# Patient Record
Sex: MALE | Race: WHITE | Employment: FULL TIME | URBAN - METROPOLITAN AREA
[De-identification: names, ages, dates, MRNs, and addresses within clinical notes are randomized per-mention and may not be internally consistent; named-entity substitution may affect disease eponyms.]

---

## 2017-10-30 ENCOUNTER — OFFICE VISIT (OUTPATIENT)
Dept: FAMILY MEDICINE CLINIC | Age: 52
End: 2017-10-30
Payer: COMMERCIAL

## 2017-10-30 VITALS
WEIGHT: 223.11 LBS | TEMPERATURE: 97.2 F | HEART RATE: 67 BPM | BODY MASS INDEX: 31.23 KG/M2 | DIASTOLIC BLOOD PRESSURE: 78 MMHG | RESPIRATION RATE: 18 BRPM | SYSTOLIC BLOOD PRESSURE: 132 MMHG | HEIGHT: 71 IN

## 2017-10-30 DIAGNOSIS — L73.9 FOLLICULITIS: Primary | ICD-10-CM

## 2017-10-30 PROCEDURE — 99213 OFFICE O/P EST LOW 20 MIN: CPT | Performed by: INTERNAL MEDICINE

## 2017-10-30 RX ORDER — MUPIROCIN CALCIUM 20 MG/G
CREAM TOPICAL
Qty: 30 G | Refills: 0 | Status: SHIPPED | OUTPATIENT
Start: 2017-10-30 | End: 2017-11-29

## 2017-10-30 RX ORDER — PREDNISONE 10 MG/1
10 TABLET ORAL DAILY
Qty: 5 TABLET | Refills: 0 | Status: SHIPPED | OUTPATIENT
Start: 2017-10-30 | End: 2017-11-04

## 2017-10-30 RX ORDER — LORAZEPAM 0.5 MG/1
0.5 TABLET ORAL
COMMUNITY
Start: 2017-10-18 | End: 2018-05-07 | Stop reason: ALTCHOICE

## 2017-10-30 ASSESSMENT — ENCOUNTER SYMPTOMS
FACIAL SWELLING: 1
EYE REDNESS: 0
SINUS PRESSURE: 0
SORE THROAT: 0
EYE DISCHARGE: 0
TROUBLE SWALLOWING: 0
VOICE CHANGE: 0
PHOTOPHOBIA: 0
EYE PAIN: 0
SINUS PAIN: 0
EYE ITCHING: 0
RHINORRHEA: 0

## 2017-10-30 ASSESSMENT — PATIENT HEALTH QUESTIONNAIRE - PHQ9
1. LITTLE INTEREST OR PLEASURE IN DOING THINGS: 0
2. FEELING DOWN, DEPRESSED OR HOPELESS: 0
SUM OF ALL RESPONSES TO PHQ QUESTIONS 1-9: 0
SUM OF ALL RESPONSES TO PHQ9 QUESTIONS 1 & 2: 0

## 2017-10-30 NOTE — PROGRESS NOTES
Visit Information    Have you changed or started any medications since your last visit including any over-the-counter medicines, vitamins, or herbal medicines? no   Are you having any side effects from any of your medications? -  no  Have you stopped taking any of your medications? Is so, why? -  no    Have you seen any other physician or provider since your last visit? No  Have you had any other diagnostic tests since your last visit? No  Have you been seen in the emergency room and/or had an admission to a hospital since we last saw you? No  Have you had your routine dental cleaning in the past 6 months? no    Have you activated your [employer] account? If not, what are your barriers?  No: Discussed at [facility]     Patient Care Team:  Bell Garrett PA-C as PCP - General (Family Medicine)    Medical History Review  Past Medical, Family, and Social History reviewed and does not contribute to the patient presenting condition    Health Maintenance   Topic Date Due    DTaP/Tdap/Td vaccine (1 - Tdap) 01/06/1983    Lipid screen  01/06/2004    Flu vaccine (1) 09/01/2017    Colon cancer screen colonoscopy  12/29/2025    Hepatitis C screen  Addressed    HIV screen  Addressed

## 2017-10-30 NOTE — PROGRESS NOTES
[address] [facility] [address]  Dept: [phone]  Dept Fax: [phone]    Ky Ball is a 47 y.o. male who presents today for his medical conditions/complaints as noted below. Ky Ball is c/o of   Chief Complaint   Patient presents with    Hair/Scalp Problem     Ingrown  hair right nostril         HPI:     Patient had \"ingrown hair /bump\" in his right outer nostril first noticed about a week ago and then symptoms got very bad to wear it hurt all the time and he noticed some associated right sided facial swelling as well. No fever or chills. He does use an electric tweezers to trim his nose hairs quite regularly. But has never had this happen before. Wife is nurse  And they have been doing warm compresses to the area and also took several days of steroid he had at home and he states today it feels much better more so the last 1-2 days much improved. No real pain . He states he noticed it has scabbed over when he feels it . Foreign Body in [address]   The incident occurred 3 to 5 days ago. Pertinent negatives include no congestion, drooling, fever, hearing loss, nosebleeds, sore throat or trouble swallowing. No results found for: LABA1C          ( goal A1C is < 7)   No results found for: LABMICR  No results found for: LDLCHOLESTEROL, LDLCALC    (goal LDL is <100)   No results found for: AST, ALT, BUN  BP Readings from Last 3 Encounters:   10/30/17 132/78   05/03/16 116/78   02/24/16 122/76          (goal 120/80)    History reviewed. No pertinent past medical history. History reviewed. No pertinent surgical history.     Family History   Problem Relation Age of Onset    Heart Failure Father      mi 43       Social History   Substance Use Topics    Smoking status: Never Smoker    Smokeless tobacco: Never Used    Alcohol use 0.0 oz/week      Current Outpatient Prescriptions   Medication Sig Dispense Refill  LORazepam (ATIVAN) 0.5 MG tablet Take 0.5 mg by mouth      mupirocin (BACTROBAN) 2 % cream Apply 3 times daily for 5 days 30 g 0    predniSONE (DELTASONE) 10 MG tablet Take 1 tablet by mouth daily for 5 days 5 tablet 0     No current facility-administered medications for this visit. No Known Allergies    Health Maintenance   Topic Date Due    DTaP/Tdap/Td vaccine (1 - Tdap) 01/06/1983    Lipid screen  01/06/2004    Flu vaccine (1) 09/01/2017    Colon cancer screen colonoscopy  12/29/2025    Hepatitis C screen  Addressed    HIV screen  Addressed       Subjective:      Review of Systems   Constitutional: Negative for activity change, appetite change, fatigue, fever and unexpected weight change. HENT: Positive for facial swelling. Negative for congestion, dental problem, drooling, ear discharge, ear pain, hearing loss, mouth sores, nosebleeds, postnasal drip, rhinorrhea, sinus pain, sinus pressure, sneezing, sore throat, tinnitus, trouble swallowing and voice change. Eyes: Negative for photophobia, pain, discharge, redness, itching and visual disturbance. Skin: Positive for wound. Objective:     Physical Exam   Constitutional: He appears well-developed and well-nourished. Non-toxic appearance. He does not have a sickly appearance. He does not appear ill. No distress. HENT:   Nose: Mucosal edema, sinus tenderness and nasal deformity present. No rhinorrhea, nose lacerations, septal deviation or nasal septal hematoma. No epistaxis. No foreign bodies. Right sinus exhibits no maxillary sinus tenderness and no frontal sinus tenderness. Left sinus exhibits no maxillary sinus tenderness and no frontal sinus tenderness. Purulent appearing discharge which has dried /scabbed to the very bottom outer /lower right lateral nostril . Sigficant erythema and mild swelling to the region as well. Cardiovascular: Normal rate, regular rhythm and normal heart sounds.     Pulmonary/Chest: Effort normal and breath sounds normal.   Abdominal: Soft. Bowel sounds are normal. There is no splenomegaly or hepatomegaly. Skin: Skin is warm and dry. No rash noted. He is not diaphoretic. Psychiatric: He has a normal mood and affect. Nursing note and vitals reviewed. /78 (Site: Left Arm, Position: Sitting, Cuff Size: Large Adult)   Pulse 67   Temp 97.2 °F (36.2 °C) (Tympanic)   Resp 18   Ht 5' 10.98\" (1.803 m)   Wt 223 lb 1.7 oz (101.2 kg)   BMI 31.13 kg/m²     Assessment:      1. Folliculitis  mupirocin (BACTROBAN) 2 % cream             Plan:      Return if symptoms worsen or fail to improve. No orders of the defined types were placed in this encounter. Orders Placed This Encounter   Medications    mupirocin (BACTROBAN) 2 % cream     Sig: Apply 3 times daily for 5 days     Dispense:  30 g     Refill:  0    predniSONE (DELTASONE) 10 MG tablet     Sig: Take 1 tablet by mouth daily for 5 days     Dispense:  5 tablet     Refill:  0          Since steroids helped can give a very low dose you can use for the next few days. Do not use any NSAIDs while on this medication . Also given bactroban cream to put on nose. Likely opened spontaneously and pain control through steroids he had at home. Advised though if pain still present after a few weeks and scabbing is gone needs to be seen to take another look . Can continue warm compresses. Nothing else in nose. Do not use tweezers . Patient given educational materials - see patient instructions. Discussed use, benefit, and side effects of prescribed medications. All patient questions answered. Pt voiced understanding. Instructed to continue current medications, diet and exercise. Patient agreed with treatment plan. Follow up as directed.      Electronically signed by Yasmine Fox DO on 10/30/2017 at 4:51 PM

## 2018-03-06 ENCOUNTER — OFFICE VISIT (OUTPATIENT)
Dept: FAMILY MEDICINE CLINIC | Age: 53
End: 2018-03-06
Payer: COMMERCIAL

## 2018-03-06 VITALS
SYSTOLIC BLOOD PRESSURE: 132 MMHG | TEMPERATURE: 98.1 F | RESPIRATION RATE: 18 BRPM | HEIGHT: 71 IN | BODY MASS INDEX: 31.23 KG/M2 | WEIGHT: 223.11 LBS | OXYGEN SATURATION: 97 % | DIASTOLIC BLOOD PRESSURE: 76 MMHG | HEART RATE: 97 BPM

## 2018-03-06 DIAGNOSIS — M54.50 ACUTE BILATERAL LOW BACK PAIN WITHOUT SCIATICA: Primary | ICD-10-CM

## 2018-03-06 DIAGNOSIS — M62.838 MUSCLE SPASM: ICD-10-CM

## 2018-03-06 PROCEDURE — 99213 OFFICE O/P EST LOW 20 MIN: CPT | Performed by: INTERNAL MEDICINE

## 2018-03-06 RX ORDER — PREDNISONE 20 MG/1
TABLET ORAL
Qty: 10 TABLET | Refills: 0 | Status: SHIPPED | OUTPATIENT
Start: 2018-03-06 | End: 2018-03-12 | Stop reason: ALTCHOICE

## 2018-03-06 RX ORDER — BACLOFEN 10 MG/1
10 TABLET ORAL 3 TIMES DAILY PRN
Qty: 30 TABLET | Refills: 0 | Status: SHIPPED | OUTPATIENT
Start: 2018-03-06 | End: 2018-03-12 | Stop reason: ALTCHOICE

## 2018-03-06 RX ORDER — TRAMADOL HYDROCHLORIDE 50 MG/1
50 TABLET ORAL EVERY 8 HOURS PRN
Qty: 21 TABLET | Refills: 0 | Status: SHIPPED | OUTPATIENT
Start: 2018-03-06 | End: 2018-03-12 | Stop reason: ALTCHOICE

## 2018-03-06 ASSESSMENT — ENCOUNTER SYMPTOMS
DIARRHEA: 0
BACK PAIN: 1
NAUSEA: 0
STRIDOR: 0
ANAL BLEEDING: 0
ABDOMINAL DISTENTION: 0
ABDOMINAL PAIN: 0
BLOOD IN STOOL: 0
SHORTNESS OF BREATH: 0
CONSTIPATION: 0
COLOR CHANGE: 0
BOWEL INCONTINENCE: 0

## 2018-03-06 NOTE — PROGRESS NOTES
[address] [facility] [address]  Dept: [phone]  Dept Fax: [phone]    John Dhillon is a 46 y.o. male who presents today for his medical conditions/complaints as noted below. John Dhillon is c/o of   Chief Complaint   Patient presents with    Lower Back Pain     After plane ride past 1 week, shooting pain         HPI:     Was on an air plane nine days ago and after that feels is when the pain started   Has a lot of stiffness when he first gets up in the morning   No numbness or tingling   No loss of bladder or bowel   No leg pain or muscle weakness  No sciatic symptoms. Has taken aleve and ibuprofen without any relief . No other gi symptoms. Has never had chronic back issues only midline low back pain       Back Pain   This is a new problem. The current episode started 1 to 4 weeks ago. The problem occurs 2 to 4 times per day. The problem is unchanged. The pain is present in the lumbar spine. The quality of the pain is described as aching, cramping and stabbing. The pain does not radiate. The pain is at a severity of 8/10. The pain is moderate. The pain is worse during the day. The symptoms are aggravated by bending, twisting and position. Stiffness is present in the morning. Pertinent negatives include no abdominal pain, bladder incontinence, bowel incontinence, chest pain, dysuria, fever, headaches, leg pain, numbness, paresis, paresthesias, pelvic pain, tingling or weakness. Risk factors include poor posture. He has tried analgesics, heat, home exercises, NSAIDs and walking for the symptoms. The treatment provided mild relief.        No results found for: LABA1C          ( goal A1C is < 7)   No results found for: LABMICR  No results found for: LDLCHOLESTEROL, LDLCALC    (goal LDL is <100)   No results found for: AST, ALT, BUN  BP Readings from Last 3 Encounters:   03/06/18 132/76   10/30/17 132/78   05/03/16 116/78          (goal 120/80)    History reviewed. No pertinent past medical history. History reviewed. No pertinent surgical history. Family History   Problem Relation Age of Onset    No Known Problems Mother     Heart Failure Father      mi 43       Social History   Substance Use Topics    Smoking status: Never Smoker    Smokeless tobacco: Never Used    Alcohol use 0.0 oz/week      Current Outpatient Prescriptions   Medication Sig Dispense Refill    predniSONE (DELTASONE) 20 MG tablet Take two tablets once daily in the morning for 5 days 10 tablet 0    baclofen (LIORESAL) 10 MG tablet Take 1 tablet by mouth 3 times daily as needed (muscle spasm) 30 tablet 0    traMADol (ULTRAM) 50 MG tablet Take 1 tablet by mouth every 8 hours as needed for Pain for up to 7 days. 21 tablet 0    LORazepam (ATIVAN) 0.5 MG tablet Take 0.5 mg by mouth       No current facility-administered medications for this visit. No Known Allergies    Health Maintenance   Topic Date Due    DTaP/Tdap/Td vaccine (1 - Tdap) 01/06/1983    Lipid screen  01/06/2004    Shingles Vaccine (1 of 2 - 2 Dose Series) 01/06/2014    Flu vaccine (1) 01/01/2019 (Originally 9/1/2017)    Diabetes screen  05/03/2019    Colon cancer screen colonoscopy  12/29/2025    Hepatitis C screen  Addressed    HIV screen  Addressed       Subjective:      Review of Systems   Constitutional: Positive for activity change. Negative for appetite change, chills, diaphoresis, fatigue, fever and unexpected weight change. Eyes: Negative for visual disturbance. Respiratory: Negative for shortness of breath and stridor. Cardiovascular: Negative for chest pain, palpitations and leg swelling. Gastrointestinal: Negative for abdominal distention, abdominal pain, anal bleeding, blood in stool, bowel incontinence, constipation, diarrhea and nausea. Genitourinary: Negative for bladder incontinence, dysuria and pelvic pain.    Musculoskeletal: Positive for arthralgias, back pain and gait problem. Negative for joint swelling, myalgias, neck pain and neck stiffness. Skin: Negative for color change and rash. Neurological: Negative for dizziness, tingling, seizures, weakness, light-headedness, numbness, headaches and paresthesias. Hematological: Negative for adenopathy. Psychiatric/Behavioral: Negative for sleep disturbance. Objective:     Physical Exam   Constitutional: He is oriented to person, place, and time. He appears well-developed and well-nourished. He is active. Non-toxic appearance. He does not have a sickly appearance. He does not appear ill. He appears distressed. Cardiovascular: Normal rate, regular rhythm and normal heart sounds. Exam reveals no gallop. No murmur heard. Pulmonary/Chest: Effort normal and breath sounds normal. No respiratory distress. He has no wheezes. Abdominal: Soft. Bowel sounds are normal. There is no splenomegaly or hepatomegaly. There is no tenderness. Musculoskeletal:        Lumbar back: He exhibits decreased range of motion, tenderness, bony tenderness, pain and spasm. He exhibits no swelling, no edema, no deformity, no laceration and normal pulse. Negative straight leg raise    Neurological: He is alert and oriented to person, place, and time. He has normal strength and normal reflexes. He displays no atrophy and no tremor. A sensory deficit is present. No cranial nerve deficit. He exhibits normal muscle tone. Gait abnormal. Coordination normal.   Skin: Skin is warm and dry. No rash noted. Psychiatric: He has a normal mood and affect. Nursing note and vitals reviewed. /76 (Site: Left Arm, Position: Sitting, Cuff Size: Large Adult)   Pulse 97   Temp 98.1 °F (36.7 °C) (Tympanic)   Resp 18   Ht 5' 10.98\" (1.803 m)   Wt 223 lb 1.7 oz (101.2 kg)   SpO2 97%   BMI 31.13 kg/m²     Assessment:      1. Acute bilateral low back pain without sciatica     2.  Muscle spasm               Plan:

## 2018-03-12 ENCOUNTER — OFFICE VISIT (OUTPATIENT)
Dept: FAMILY MEDICINE CLINIC | Age: 53
End: 2018-03-12
Payer: COMMERCIAL

## 2018-03-12 VITALS
OXYGEN SATURATION: 98 % | BODY MASS INDEX: 31.23 KG/M2 | TEMPERATURE: 97.1 F | HEIGHT: 71 IN | RESPIRATION RATE: 18 BRPM | DIASTOLIC BLOOD PRESSURE: 74 MMHG | WEIGHT: 223.11 LBS | HEART RATE: 62 BPM | SYSTOLIC BLOOD PRESSURE: 122 MMHG

## 2018-03-12 DIAGNOSIS — M79.604 PAIN OF RIGHT LOWER EXTREMITY: ICD-10-CM

## 2018-03-12 DIAGNOSIS — M54.41 ACUTE BILATERAL LOW BACK PAIN WITH RIGHT-SIDED SCIATICA: Primary | ICD-10-CM

## 2018-03-12 PROCEDURE — 99213 OFFICE O/P EST LOW 20 MIN: CPT | Performed by: INTERNAL MEDICINE

## 2018-03-12 RX ORDER — TIZANIDINE 4 MG/1
4 TABLET ORAL EVERY 8 HOURS PRN
Qty: 30 TABLET | Refills: 0 | Status: SHIPPED | OUTPATIENT
Start: 2018-03-12 | End: 2018-05-07 | Stop reason: ALTCHOICE

## 2018-03-12 RX ORDER — HYDROCODONE BITARTRATE AND ACETAMINOPHEN 5; 325 MG/1; MG/1
TABLET ORAL
Qty: 21 TABLET | Refills: 0 | Status: SHIPPED | OUTPATIENT
Start: 2018-03-12 | End: 2018-04-04 | Stop reason: SDUPTHER

## 2018-03-12 RX ORDER — GABAPENTIN 300 MG/1
300 CAPSULE ORAL DAILY
Qty: 60 CAPSULE | Refills: 0 | Status: SHIPPED | OUTPATIENT
Start: 2018-03-12 | End: 2018-04-04 | Stop reason: SDUPTHER

## 2018-03-12 ASSESSMENT — ENCOUNTER SYMPTOMS
ABDOMINAL PAIN: 0
BACK PAIN: 1
WHEEZING: 0
DIARRHEA: 0
BOWEL INCONTINENCE: 0
CONSTIPATION: 0
ABDOMINAL DISTENTION: 0
COLOR CHANGE: 0
BLOOD IN STOOL: 0
SHORTNESS OF BREATH: 0

## 2018-03-12 NOTE — PROGRESS NOTES
Visit Information    Have you changed or started any medications since your last visit including any over-the-counter medicines, vitamins, or herbal medicines? no   Are you having any side effects from any of your medications? -  no  Have you stopped taking any of your medications? Is so, why? -  no    Have you seen any other physician or provider since your last visit? No  Have you had any other diagnostic tests since your last visit? No  Have you been seen in the emergency room and/or had an admission to a hospital since we last saw you? No  Have you had your routine dental cleaning in the past 6 months? no    Have you activated your [employer] account? If not, what are your barriers?  Yes     Patient Care Team:  Carin Benitez PA-C as PCP - General (Family Medicine)    Medical History Review  Past Medical, Family, and Social History reviewed and does contribute to the patient presenting condition    Health Maintenance   Topic Date Due    DTaP/Tdap/Td vaccine (1 - Tdap) 01/06/1983    Lipid screen  01/06/2004    Shingles Vaccine (1 of 2 - 2 Dose Series) 01/06/2014    Flu vaccine (1) 01/01/2019 (Originally 9/1/2017)    Diabetes screen  05/03/2019    Colon cancer screen colonoscopy  12/29/2025    Hepatitis C screen  Addressed    HIV screen  Addressed

## 2018-03-13 NOTE — PROGRESS NOTES
Topics    Smoking status: Never Smoker    Smokeless tobacco: Never Used    Alcohol use 0.0 oz/week      Current Outpatient Prescriptions   Medication Sig Dispense Refill    gabapentin (NEURONTIN) 300 MG capsule Take 1 capsule by mouth daily for 30 days. 60 capsule 0    tiZANidine (ZANAFLEX) 4 MG tablet Take 1 tablet by mouth every 8 hours as needed (muscle spasm) 30 tablet 0    HYDROcodone-acetaminophen (NORCO) 5-325 MG per tablet 1 tablet  every 8 hours as needed for severe pain. 21 tablet 0    LORazepam (ATIVAN) 0.5 MG tablet Take 0.5 mg by mouth       No current facility-administered medications for this visit. No Known Allergies    Health Maintenance   Topic Date Due    DTaP/Tdap/Td vaccine (1 - Tdap) 01/06/1983    Lipid screen  01/06/2004    Shingles Vaccine (1 of 2 - 2 Dose Series) 01/06/2014    Flu vaccine (1) 01/01/2019 (Originally 9/1/2017)    Diabetes screen  05/03/2019    Colon cancer screen colonoscopy  12/29/2025    Hepatitis C screen  Addressed    HIV screen  Addressed       Subjective:      Review of Systems   Constitutional: Positive for activity change. Negative for appetite change, chills, diaphoresis, fatigue, fever and unexpected weight change. Eyes: Negative for visual disturbance. Respiratory: Negative for shortness of breath and wheezing. Cardiovascular: Negative for chest pain, palpitations and leg swelling. Gastrointestinal: Negative for abdominal distention, abdominal pain, blood in stool, bowel incontinence, constipation and diarrhea. Genitourinary: Negative for bladder incontinence, decreased urine volume, difficulty urinating, dysuria, pelvic pain and urgency. Musculoskeletal: Positive for back pain, gait problem and myalgias. Negative for arthralgias and joint swelling. Skin: Negative for color change, pallor and rash. Neurological: Positive for weakness, numbness and paresthesias. Negative for dizziness, tingling, light-headedness and headaches. Contrast     Standing Status:   Future     Standing Expiration Date:   3/12/2019     Order Specific Question:   Reason for exam:     Answer:   low back pain , musce weakness , numbness    Amb External Referral To Physical Therapy     Referral Priority:   Routine     Referral Type:   Consult for Advice and Opinion     Referral Reason:   Specialty Services Required     Requested Specialty:   Physical Therapy     Number of Visits Requested:   1     Orders Placed This Encounter   Medications    gabapentin (NEURONTIN) 300 MG capsule     Sig: Take 1 capsule by mouth daily for 30 days. Dispense:  60 capsule     Refill:  0    tiZANidine (ZANAFLEX) 4 MG tablet     Sig: Take 1 tablet by mouth every 8 hours as needed (muscle spasm)     Dispense:  30 tablet     Refill:  0    HYDROcodone-acetaminophen (NORCO) 5-325 MG per tablet     Si tablet  every 8 hours as needed for severe pain. Dispense:  21 tablet     Refill:  0   Very small script for norco for severe pain . Use sparingly . At bedtime when you cannot sleep due to pain as I will not do any refills on this /we will  NOT be allowed to at any cost as we do not do chronic pain management. Based on new and worsening symptoms will purse MRI. aDVISED to see a massage therapist in the next week as well   Tried and failed topical lidocaine patch as well ( borrrowed script from [facility])   Completed steroids and tramadol w/o any relief. Kalyan trial gabapentin . Pt advised to try first dose at night in case it does make him drowsy and will trial a different muscle relxant. Will also place referal to physical therapy  Will follow up after MRI results. reviewed SYMPTOMS OF for which you need to go to ED and pt verbalized understanding   Will have you follow up after MRI   Patient given educational materials - see patient instructions. Discussed use, benefit, and side effects of prescribed medications. All patient questions answered. Pt voiced understanding. Reviewed health maintenance. Instructed to continue current medications, diet and exercise. Patient agreed with treatment plan. Follow up as directed.      Electronically signed by Keyona Nazario DO on 3/12/2018 at 10:12 PM

## 2018-03-20 DIAGNOSIS — M79.604 PAIN OF RIGHT LOWER EXTREMITY: ICD-10-CM

## 2018-03-20 DIAGNOSIS — M54.41 ACUTE BILATERAL LOW BACK PAIN WITH RIGHT-SIDED SCIATICA: ICD-10-CM

## 2018-03-20 NOTE — TELEPHONE ENCOUNTER
Mri is not until 4/2/2018 and his back is really hurting.   He is trying to get the appointment moved up so will you rx for more pain medication  Last refill was 21 tabs on 3/12/18  He is taking bid

## 2018-03-21 RX ORDER — HYDROCODONE BITARTRATE AND ACETAMINOPHEN 5; 325 MG/1; MG/1
TABLET ORAL
Qty: 21 TABLET | Refills: 0 | OUTPATIENT
Start: 2018-03-21 | End: 2018-04-20

## 2018-03-21 NOTE — TELEPHONE ENCOUNTER
I talked to dr Lorena Kim and she said she gave patient 21 norcos on 3/12 and was told to use sparingly for severe pain and she will not do any refills. I told patient that and he said he used his last pill last night and he does use them sparingly because they make him constipated. He told me its a little ridiculous and is going to find another doctor.

## 2018-03-28 ENCOUNTER — TELEPHONE (OUTPATIENT)
Dept: FAMILY MEDICINE CLINIC | Age: 53
End: 2018-03-28

## 2018-03-28 RX ORDER — LORAZEPAM 0.5 MG/1
0.5 TABLET ORAL ONCE
Qty: 1 TABLET | Refills: 0 | Status: SHIPPED | OUTPATIENT
Start: 2018-03-28 | End: 2018-03-28

## 2018-03-28 NOTE — TELEPHONE ENCOUNTER
Sent in one ativan . Will need someone to take him though then if he is going to take the ativan and should wait until he gets there to take it.

## 2018-04-03 PROBLEM — M48.061 SPINAL STENOSIS OF LUMBAR REGION WITHOUT NEUROGENIC CLAUDICATION: Status: ACTIVE | Noted: 2018-04-03

## 2019-05-16 PROBLEM — Z13.220 LIPID SCREENING: Status: ACTIVE | Noted: 2019-05-16

## 2019-05-16 PROBLEM — F51.01 PRIMARY INSOMNIA: Status: ACTIVE | Noted: 2019-05-16

## 2019-06-15 PROBLEM — Z13.220 LIPID SCREENING: Status: RESOLVED | Noted: 2019-05-16 | Resolved: 2019-06-15

## 2021-10-25 PROBLEM — N43.3 RIGHT HYDROCELE: Status: ACTIVE | Noted: 2021-10-25

## 2022-02-04 PROBLEM — R79.89 LOW TESTOSTERONE IN MALE: Status: ACTIVE | Noted: 2022-02-04

## 2022-02-04 PROBLEM — R53.83 OTHER FATIGUE: Status: ACTIVE | Noted: 2022-02-04

## 2022-02-04 PROBLEM — E29.1 HYPOGONADISM IN MALE: Status: ACTIVE | Noted: 2022-02-04
